# Patient Record
Sex: FEMALE | Race: ASIAN | Employment: FULL TIME | URBAN - METROPOLITAN AREA
[De-identification: names, ages, dates, MRNs, and addresses within clinical notes are randomized per-mention and may not be internally consistent; named-entity substitution may affect disease eponyms.]

---

## 2023-10-30 ENCOUNTER — HOSPITAL ENCOUNTER (EMERGENCY)
Facility: HOSPITAL | Age: 38
Discharge: HOME | End: 2023-10-31
Attending: EMERGENCY MEDICINE
Payer: COMMERCIAL

## 2023-10-30 DIAGNOSIS — O20.0 THREATENED MISCARRIAGE: Primary | ICD-10-CM

## 2023-10-30 DIAGNOSIS — O41.8X10 SUBCHORIONIC HEMATOMA IN FIRST TRIMESTER, SINGLE OR UNSPECIFIED FETUS: ICD-10-CM

## 2023-10-30 DIAGNOSIS — O46.8X1 SUBCHORIONIC HEMATOMA IN FIRST TRIMESTER, SINGLE OR UNSPECIFIED FETUS: ICD-10-CM

## 2023-10-30 LAB
ALBUMIN SERPL-MCNC: 3.3 G/DL (ref 3.4–5)
ALBUMIN/GLOB SERPL: 0.8 {RATIO} (ref 1–2)
ALP LIVER SERPL-CCNC: 73 U/L
ALT SERPL-CCNC: 16 U/L
ANION GAP SERPL CALC-SCNC: 6 MMOL/L (ref 0–18)
AST SERPL-CCNC: 8 U/L (ref 15–37)
B-HCG SERPL-ACNC: ABNORMAL MIU/ML
BASOPHILS # BLD AUTO: 0.02 X10(3) UL (ref 0–0.2)
BASOPHILS NFR BLD AUTO: 0.3 %
BILIRUB SERPL-MCNC: 0.4 MG/DL (ref 0.1–2)
BUN BLD-MCNC: 13 MG/DL (ref 7–18)
CALCIUM BLD-MCNC: 8.6 MG/DL (ref 8.5–10.1)
CHLORIDE SERPL-SCNC: 104 MMOL/L (ref 98–112)
CO2 SERPL-SCNC: 25 MMOL/L (ref 21–32)
CREAT BLD-MCNC: 0.69 MG/DL
EGFRCR SERPLBLD CKD-EPI 2021: 114 ML/MIN/1.73M2 (ref 60–?)
EOSINOPHIL # BLD AUTO: 0.08 X10(3) UL (ref 0–0.7)
EOSINOPHIL NFR BLD AUTO: 1.1 %
ERYTHROCYTE [DISTWIDTH] IN BLOOD BY AUTOMATED COUNT: 16 %
GLOBULIN PLAS-MCNC: 4 G/DL (ref 2.8–4.4)
GLUCOSE BLD-MCNC: 103 MG/DL (ref 70–99)
HCT VFR BLD AUTO: 33 %
HGB BLD-MCNC: 10 G/DL
IMM GRANULOCYTES # BLD AUTO: 0.02 X10(3) UL (ref 0–1)
IMM GRANULOCYTES NFR BLD: 0.3 %
LYMPHOCYTES # BLD AUTO: 1.43 X10(3) UL (ref 1–4)
LYMPHOCYTES NFR BLD AUTO: 18.8 %
MCH RBC QN AUTO: 20 PG (ref 26–34)
MCHC RBC AUTO-ENTMCNC: 30.3 G/DL (ref 31–37)
MCV RBC AUTO: 66.1 FL
MONOCYTES # BLD AUTO: 0.55 X10(3) UL (ref 0.1–1)
MONOCYTES NFR BLD AUTO: 7.2 %
NEUTROPHILS # BLD AUTO: 5.49 X10 (3) UL (ref 1.5–7.7)
NEUTROPHILS # BLD AUTO: 5.49 X10(3) UL (ref 1.5–7.7)
NEUTROPHILS NFR BLD AUTO: 72.3 %
OSMOLALITY SERPL CALC.SUM OF ELEC: 280 MOSM/KG (ref 275–295)
PLATELET # BLD AUTO: 213 10(3)UL (ref 150–450)
POTASSIUM SERPL-SCNC: 3.4 MMOL/L (ref 3.5–5.1)
PROT SERPL-MCNC: 7.3 G/DL (ref 6.4–8.2)
RBC # BLD AUTO: 4.99 X10(6)UL
SODIUM SERPL-SCNC: 135 MMOL/L (ref 136–145)
WBC # BLD AUTO: 7.6 X10(3) UL (ref 4–11)

## 2023-10-30 PROCEDURE — 85025 COMPLETE CBC W/AUTO DIFF WBC: CPT

## 2023-10-30 PROCEDURE — 84702 CHORIONIC GONADOTROPIN TEST: CPT | Performed by: EMERGENCY MEDICINE

## 2023-10-30 PROCEDURE — 99284 EMERGENCY DEPT VISIT MOD MDM: CPT

## 2023-10-30 PROCEDURE — 85025 COMPLETE CBC W/AUTO DIFF WBC: CPT | Performed by: EMERGENCY MEDICINE

## 2023-10-30 PROCEDURE — 80053 COMPREHEN METABOLIC PANEL: CPT | Performed by: EMERGENCY MEDICINE

## 2023-10-30 PROCEDURE — 86900 BLOOD TYPING SEROLOGIC ABO: CPT | Performed by: EMERGENCY MEDICINE

## 2023-10-30 PROCEDURE — 80053 COMPREHEN METABOLIC PANEL: CPT

## 2023-10-30 PROCEDURE — 99285 EMERGENCY DEPT VISIT HI MDM: CPT

## 2023-10-30 PROCEDURE — 86901 BLOOD TYPING SEROLOGIC RH(D): CPT

## 2023-10-30 PROCEDURE — 36415 COLL VENOUS BLD VENIPUNCTURE: CPT

## 2023-10-30 PROCEDURE — 86901 BLOOD TYPING SEROLOGIC RH(D): CPT | Performed by: EMERGENCY MEDICINE

## 2023-10-30 PROCEDURE — 84702 CHORIONIC GONADOTROPIN TEST: CPT

## 2023-10-30 PROCEDURE — 86900 BLOOD TYPING SEROLOGIC ABO: CPT

## 2023-10-31 ENCOUNTER — APPOINTMENT (OUTPATIENT)
Dept: ULTRASOUND IMAGING | Facility: HOSPITAL | Age: 38
End: 2023-10-31
Attending: EMERGENCY MEDICINE
Payer: COMMERCIAL

## 2023-10-31 VITALS
RESPIRATION RATE: 18 BRPM | TEMPERATURE: 98 F | HEART RATE: 83 BPM | BODY MASS INDEX: 27 KG/M2 | DIASTOLIC BLOOD PRESSURE: 68 MMHG | OXYGEN SATURATION: 100 % | SYSTOLIC BLOOD PRESSURE: 100 MMHG | WEIGHT: 146 LBS

## 2023-10-31 LAB — RH BLOOD TYPE: POSITIVE

## 2023-10-31 PROCEDURE — 76801 OB US < 14 WKS SINGLE FETUS: CPT | Performed by: EMERGENCY MEDICINE

## 2023-10-31 NOTE — ED INITIAL ASSESSMENT (HPI)
Pt presents to ed c/o vaginal bleeding, states she is 10 weeks pregnant. States bleeding started about 15min pta. Pt also c/o medial cramping at a 7/10.

## 2023-11-07 LAB
ANTIBODY SCREEN OB: NEGATIVE
HEPATITIS B SURFACE ANTIGEN OB: NEGATIVE
HIV RESULT OB: NEGATIVE
RH FACTOR OB: POSITIVE
SYPHILIS-TOTAL QUAL: NONREACTIVE

## 2024-03-20 LAB — HIV RESULT OB: NEGATIVE

## 2024-04-12 ENCOUNTER — HOSPITAL ENCOUNTER (OUTPATIENT)
Facility: HOSPITAL | Age: 39
Discharge: HOME | End: 2024-04-12
Attending: OBSTETRICS & GYNECOLOGY | Admitting: OBSTETRICS & GYNECOLOGY
Payer: COMMERCIAL

## 2024-04-12 VITALS
RESPIRATION RATE: 18 BRPM | HEIGHT: 62 IN | TEMPERATURE: 98 F | DIASTOLIC BLOOD PRESSURE: 71 MMHG | HEART RATE: 88 BPM | WEIGHT: 161 LBS | SYSTOLIC BLOOD PRESSURE: 122 MMHG | BODY MASS INDEX: 29.63 KG/M2

## 2024-04-12 LAB
BASOPHILS # BLD AUTO: 0.02 X10(3) UL (ref 0–0.2)
BASOPHILS NFR BLD AUTO: 0.2 %
EOSINOPHIL # BLD AUTO: 0.08 X10(3) UL (ref 0–0.7)
EOSINOPHIL NFR BLD AUTO: 0.8 %
ERYTHROCYTE [DISTWIDTH] IN BLOOD BY AUTOMATED COUNT: 22.2 %
HCT VFR BLD AUTO: 26.5 %
HGB BLD-MCNC: 7.1 G/DL
IMM GRANULOCYTES # BLD AUTO: 0.14 X10(3) UL (ref 0–1)
IMM GRANULOCYTES NFR BLD: 1.3 %
KLEIHAUER BETKE RESULT: NEGATIVE
LYMPHOCYTES # BLD AUTO: 1.85 X10(3) UL (ref 1–4)
LYMPHOCYTES NFR BLD AUTO: 17.7 %
MCH RBC QN AUTO: 15.7 PG (ref 26–34)
MCHC RBC AUTO-ENTMCNC: 26.8 G/DL (ref 31–37)
MCV RBC AUTO: 58.5 FL
MONOCYTES # BLD AUTO: 0.85 X10(3) UL (ref 0.1–1)
MONOCYTES NFR BLD AUTO: 8.1 %
NEUTROPHILS # BLD AUTO: 7.53 X10 (3) UL (ref 1.5–7.7)
NEUTROPHILS # BLD AUTO: 7.53 X10(3) UL (ref 1.5–7.7)
NEUTROPHILS NFR BLD AUTO: 71.9 %
PLATELET # BLD AUTO: 254 10(3)UL (ref 150–450)
PLATELETS.RETICULATED NFR BLD AUTO: 9.8 % (ref 0–7)
RBC # BLD AUTO: 4.53 X10(6)UL
WBC # BLD AUTO: 10.5 X10(3) UL (ref 4–11)

## 2024-04-12 PROCEDURE — 36415 COLL VENOUS BLD VENIPUNCTURE: CPT

## 2024-04-12 PROCEDURE — 85460 HEMOGLOBIN FETAL: CPT | Performed by: OBSTETRICS & GYNECOLOGY

## 2024-04-12 PROCEDURE — 59025 FETAL NON-STRESS TEST: CPT

## 2024-04-12 PROCEDURE — 99213 OFFICE O/P EST LOW 20 MIN: CPT

## 2024-04-12 PROCEDURE — 85025 COMPLETE CBC W/AUTO DIFF WBC: CPT | Performed by: OBSTETRICS & GYNECOLOGY

## 2024-04-12 RX ORDER — ACETAMINOPHEN 500 MG
TABLET ORAL
Status: COMPLETED
Start: 2024-04-12 | End: 2024-04-12

## 2024-04-12 RX ORDER — ACETAMINOPHEN 500 MG
1000 TABLET ORAL ONCE
Status: COMPLETED | OUTPATIENT
Start: 2024-04-12 | End: 2024-04-12

## 2024-04-13 NOTE — DISCHARGE INSTRUCTIONS
Discharge Instructions    Diet: regular diet/push fluids  Activity: Normal activity         General Instructions    Call your OB doctor if: Fluid leaking from your vagina;Uterine contractions 10 minutes or closer for 1 to 2 hours;Uterine contractions increasing in intensity and frequency;Decrease in fetal movement;Vaginal bleeding;Temperature greater than 100F;Vaginal or rectal pressure  Early labor comfort measures: Drink fluids and eat small light meals;Relax, sleep, take a warm bath or shower for 30 minutes or less;Take a walk       Labor Discharge Instructions    Call your OB doctor if you have any of the following signs:    Period-like cramps that may come and go  Low, dull backache  Pressure in your lower abdomen that may feel like the baby is pushing down  Change in the type or amount of vaginal discharge  Abdominal cramps that may be accompanied by diarrhea  Fluid leaking from your vagina (may or may not be bloody)  Uterine Activity Instructions:

## 2024-04-13 NOTE — NST
Nonstress Test   Patient: Ness Sanchez    Gestation: 33w5d    NST:       Variability: Moderate           Accelerations: Yes           Decelerations: None            Baseline: 130 BPM           Uterine Irritability: No           Contractions: Not present                                        Acoustic Stimulator: No           Nonstress Test Interpretation: Reactive           Nonstress Test Second Interpretation: Reactive                     Additional Comments    Physician Evaluation      NST Interpretation: Reactive    Disposition:   Discharged    Comments:        Mario Lynne MD

## 2024-04-13 NOTE — PROGRESS NOTES
Written and verbal discharge instructions given to patient and spouse, questions answered and verbalized understanding of teaching. Patient discharged via wheelchair, accompanied by staff, with all belongings, accompanied by spouse, undelivered, not in active labor. Patient instructed to move slowly as to not cause dizziness r/t her anemia and to keep her appointment with Hematology on Wednesday.

## 2024-04-13 NOTE — PROGRESS NOTES
Pt is a 38 year old female admitted to [facility].     Chief Complaint   Patient presents with    Decreased Fetal Movement    Mva/fall/trauma     Patient here with c/o a fall on her stairs around , where her left foot slipped and she landed directly on her back and her neck hit the next step up. She did not hit her abdomen, just her lower back and neck. She states she did not feel the baby move after her fall. She has a scrape on her right elbow and c/o abdominal tightness and cramping and lower back pain since she fell. She denies any leaking of fluid.      Pt is  33w5d intra-uterine pregnancy.  History obtained. Oriented to room, staff, and plan of care.

## 2024-05-02 LAB — STREP GP B CULT OB: NEGATIVE

## 2024-05-07 ENCOUNTER — TELEPHONE (OUTPATIENT)
Dept: OBGYN UNIT | Facility: HOSPITAL | Age: 39
End: 2024-05-07

## 2024-05-09 ENCOUNTER — ANESTHESIA EVENT (OUTPATIENT)
Dept: OBGYN UNIT | Facility: HOSPITAL | Age: 39
End: 2024-05-09
Payer: COMMERCIAL

## 2024-05-09 ENCOUNTER — APPOINTMENT (OUTPATIENT)
Dept: ULTRASOUND IMAGING | Facility: HOSPITAL | Age: 39
End: 2024-05-09
Attending: OBSTETRICS & GYNECOLOGY

## 2024-05-09 ENCOUNTER — TELEPHONE (OUTPATIENT)
Dept: OBGYN UNIT | Facility: HOSPITAL | Age: 39
End: 2024-05-09

## 2024-05-09 ENCOUNTER — ANESTHESIA (OUTPATIENT)
Dept: OBGYN UNIT | Facility: HOSPITAL | Age: 39
End: 2024-05-09
Payer: COMMERCIAL

## 2024-05-09 ENCOUNTER — HOSPITAL ENCOUNTER (INPATIENT)
Facility: HOSPITAL | Age: 39
LOS: 2 days | Discharge: HOME | End: 2024-05-11
Attending: OBSTETRICS & GYNECOLOGY | Admitting: OBSTETRICS & GYNECOLOGY

## 2024-05-09 DIAGNOSIS — Z98.891 H/O CESAREAN SECTION: ICD-10-CM

## 2024-05-09 PROBLEM — Z34.90 PREGNANCY (HCC): Status: ACTIVE | Noted: 2024-05-09

## 2024-05-09 LAB
ANTIBODY SCREEN: NEGATIVE
BASOPHILS # BLD AUTO: 0.02 X10(3) UL (ref 0–0.2)
BASOPHILS NFR BLD AUTO: 0.2 %
EOSINOPHIL # BLD AUTO: 0.05 X10(3) UL (ref 0–0.7)
EOSINOPHIL NFR BLD AUTO: 0.6 %
HCT VFR BLD AUTO: 39.6 %
HGB BLD-MCNC: 11 G/DL
IMM GRANULOCYTES # BLD AUTO: 0.08 X10(3) UL (ref 0–1)
IMM GRANULOCYTES NFR BLD: 0.9 %
LYMPHOCYTES # BLD AUTO: 1.13 X10(3) UL (ref 1–4)
LYMPHOCYTES NFR BLD AUTO: 12.5 %
MCH RBC QN AUTO: 20.2 PG (ref 26–34)
MCHC RBC AUTO-ENTMCNC: 27.8 G/DL (ref 31–37)
MCV RBC AUTO: 72.8 FL
MONOCYTES # BLD AUTO: 0.58 X10(3) UL (ref 0.1–1)
MONOCYTES NFR BLD AUTO: 6.4 %
NEUTROPHILS # BLD AUTO: 7.19 X10 (3) UL (ref 1.5–7.7)
NEUTROPHILS # BLD AUTO: 7.19 X10(3) UL (ref 1.5–7.7)
NEUTROPHILS NFR BLD AUTO: 79.4 %
PLATELET # BLD AUTO: 278 10(3)UL (ref 150–450)
PLATELET MORPHOLOGY: NORMAL
PLATELETS.RETICULATED NFR BLD AUTO: 10.7 % (ref 0–7)
RBC # BLD AUTO: 5.44 X10(6)UL
RH BLOOD TYPE: POSITIVE
RUPTURE OF MEMBRANE (ROM): POSITIVE
T PALLIDUM AB SER QL IA: NONREACTIVE
WBC # BLD AUTO: 9.1 X10(3) UL (ref 4–11)

## 2024-05-09 PROCEDURE — 86780 TREPONEMA PALLIDUM: CPT | Performed by: OBSTETRICS & GYNECOLOGY

## 2024-05-09 PROCEDURE — 86901 BLOOD TYPING SEROLOGIC RH(D): CPT | Performed by: OBSTETRICS & GYNECOLOGY

## 2024-05-09 PROCEDURE — 88302 TISSUE EXAM BY PATHOLOGIST: CPT | Performed by: OBSTETRICS & GYNECOLOGY

## 2024-05-09 PROCEDURE — 84112 EVAL AMNIOTIC FLUID PROTEIN: CPT | Performed by: OBSTETRICS & GYNECOLOGY

## 2024-05-09 PROCEDURE — 86900 BLOOD TYPING SEROLOGIC ABO: CPT | Performed by: OBSTETRICS & GYNECOLOGY

## 2024-05-09 PROCEDURE — 99214 OFFICE O/P EST MOD 30 MIN: CPT

## 2024-05-09 PROCEDURE — 86850 RBC ANTIBODY SCREEN: CPT | Performed by: OBSTETRICS & GYNECOLOGY

## 2024-05-09 PROCEDURE — 0UB70ZZ EXCISION OF BILATERAL FALLOPIAN TUBES, OPEN APPROACH: ICD-10-PCS | Performed by: OBSTETRICS & GYNECOLOGY

## 2024-05-09 PROCEDURE — 85025 COMPLETE CBC W/AUTO DIFF WBC: CPT | Performed by: OBSTETRICS & GYNECOLOGY

## 2024-05-09 RX ORDER — DEXTROSE, SODIUM CHLORIDE, SODIUM LACTATE, POTASSIUM CHLORIDE, AND CALCIUM CHLORIDE 5; .6; .31; .03; .02 G/100ML; G/100ML; G/100ML; G/100ML; G/100ML
INJECTION, SOLUTION INTRAVENOUS CONTINUOUS PRN
Status: DISCONTINUED | OUTPATIENT
Start: 2024-05-09 | End: 2024-05-11

## 2024-05-09 RX ORDER — DIPHENHYDRAMINE HCL 25 MG
25 CAPSULE ORAL EVERY 4 HOURS PRN
Status: DISCONTINUED | OUTPATIENT
Start: 2024-05-09 | End: 2024-05-11

## 2024-05-09 RX ORDER — HYDROMORPHONE HYDROCHLORIDE 1 MG/ML
0.4 INJECTION, SOLUTION INTRAMUSCULAR; INTRAVENOUS; SUBCUTANEOUS EVERY 5 MIN PRN
Status: DISCONTINUED | OUTPATIENT
Start: 2024-05-09 | End: 2024-05-09 | Stop reason: HOSPADM

## 2024-05-09 RX ORDER — ONDANSETRON 2 MG/ML
4 INJECTION INTRAMUSCULAR; INTRAVENOUS ONCE AS NEEDED
Status: COMPLETED | OUTPATIENT
Start: 2024-05-09 | End: 2024-05-09

## 2024-05-09 RX ORDER — NALOXONE HYDROCHLORIDE 0.4 MG/ML
0.08 INJECTION, SOLUTION INTRAMUSCULAR; INTRAVENOUS; SUBCUTANEOUS
Status: ACTIVE | OUTPATIENT
Start: 2024-05-09 | End: 2024-05-10

## 2024-05-09 RX ORDER — HYDROMORPHONE HYDROCHLORIDE 1 MG/ML
0.2 INJECTION, SOLUTION INTRAMUSCULAR; INTRAVENOUS; SUBCUTANEOUS EVERY 5 MIN PRN
Status: DISCONTINUED | OUTPATIENT
Start: 2024-05-09 | End: 2024-05-09 | Stop reason: HOSPADM

## 2024-05-09 RX ORDER — NALBUPHINE HYDROCHLORIDE 10 MG/ML
2.5 INJECTION, SOLUTION INTRAMUSCULAR; INTRAVENOUS; SUBCUTANEOUS EVERY 4 HOURS PRN
Status: DISCONTINUED | OUTPATIENT
Start: 2024-05-09 | End: 2024-05-11

## 2024-05-09 RX ORDER — CEFAZOLIN SODIUM/WATER 2 G/20 ML
2 SYRINGE (ML) INTRAVENOUS ONCE
Status: COMPLETED | OUTPATIENT
Start: 2024-05-09 | End: 2024-05-09

## 2024-05-09 RX ORDER — ONDANSETRON 2 MG/ML
4 INJECTION INTRAMUSCULAR; INTRAVENOUS EVERY 6 HOURS PRN
Status: DISCONTINUED | OUTPATIENT
Start: 2024-05-09 | End: 2024-05-11

## 2024-05-09 RX ORDER — KETOROLAC TROMETHAMINE 30 MG/ML
30 INJECTION, SOLUTION INTRAMUSCULAR; INTRAVENOUS EVERY 6 HOURS PRN
Status: DISCONTINUED | OUTPATIENT
Start: 2024-05-09 | End: 2024-05-11

## 2024-05-09 RX ORDER — SODIUM CHLORIDE, SODIUM LACTATE, POTASSIUM CHLORIDE, CALCIUM CHLORIDE 600; 310; 30; 20 MG/100ML; MG/100ML; MG/100ML; MG/100ML
INJECTION, SOLUTION INTRAVENOUS CONTINUOUS
Status: DISCONTINUED | OUTPATIENT
Start: 2024-05-09 | End: 2024-05-11

## 2024-05-09 RX ORDER — CITRIC ACID/SODIUM CITRATE 334-500MG
30 SOLUTION, ORAL ORAL ONCE
Status: COMPLETED | OUTPATIENT
Start: 2024-05-09 | End: 2024-05-09

## 2024-05-09 RX ORDER — BISACODYL 10 MG
10 SUPPOSITORY, RECTAL RECTAL ONCE AS NEEDED
Status: DISCONTINUED | OUTPATIENT
Start: 2024-05-09 | End: 2024-05-11

## 2024-05-09 RX ORDER — SODIUM CHLORIDE, SODIUM LACTATE, POTASSIUM CHLORIDE, CALCIUM CHLORIDE 600; 310; 30; 20 MG/100ML; MG/100ML; MG/100ML; MG/100ML
125 INJECTION, SOLUTION INTRAVENOUS CONTINUOUS
Status: DISCONTINUED | OUTPATIENT
Start: 2024-05-09 | End: 2024-05-09

## 2024-05-09 RX ORDER — KETOROLAC TROMETHAMINE 30 MG/ML
INJECTION, SOLUTION INTRAMUSCULAR; INTRAVENOUS
Status: DISPENSED
Start: 2024-05-09 | End: 2024-05-10

## 2024-05-09 RX ORDER — HYDROMORPHONE HYDROCHLORIDE 1 MG/ML
0.3 INJECTION, SOLUTION INTRAMUSCULAR; INTRAVENOUS; SUBCUTANEOUS EVERY 2 HOUR PRN
Status: DISCONTINUED | OUTPATIENT
Start: 2024-05-09 | End: 2024-05-11

## 2024-05-09 RX ORDER — NALBUPHINE HYDROCHLORIDE 10 MG/ML
2.5 INJECTION, SOLUTION INTRAMUSCULAR; INTRAVENOUS; SUBCUTANEOUS
Status: DISCONTINUED | OUTPATIENT
Start: 2024-05-09 | End: 2024-05-09 | Stop reason: HOSPADM

## 2024-05-09 RX ORDER — ONDANSETRON 2 MG/ML
INJECTION INTRAMUSCULAR; INTRAVENOUS
Status: COMPLETED
Start: 2024-05-09 | End: 2024-05-09

## 2024-05-09 RX ORDER — MORPHINE SULFATE 4 MG/ML
INJECTION, SOLUTION INTRAMUSCULAR; INTRAVENOUS
Status: DISPENSED
Start: 2024-05-09 | End: 2024-05-10

## 2024-05-09 RX ORDER — OXYCODONE HYDROCHLORIDE 5 MG/1
5 TABLET ORAL EVERY 6 HOURS PRN
Status: DISCONTINUED | OUTPATIENT
Start: 2024-05-09 | End: 2024-05-11

## 2024-05-09 RX ORDER — MORPHINE SULFATE 0.5 MG/ML
0.2 INJECTION, SOLUTION EPIDURAL; INTRATHECAL; INTRAVENOUS ONCE
Status: DISCONTINUED | OUTPATIENT
Start: 2024-05-09 | End: 2024-05-09

## 2024-05-09 RX ORDER — DOCUSATE SODIUM 100 MG/1
100 CAPSULE, LIQUID FILLED ORAL
Status: DISCONTINUED | OUTPATIENT
Start: 2024-05-09 | End: 2024-05-11

## 2024-05-09 RX ORDER — ONDANSETRON 2 MG/ML
4 INJECTION INTRAMUSCULAR; INTRAVENOUS EVERY 6 HOURS PRN
Status: DISCONTINUED | OUTPATIENT
Start: 2024-05-09 | End: 2024-05-09

## 2024-05-09 RX ORDER — MORPHINE SULFATE 4 MG/ML
2 INJECTION, SOLUTION INTRAMUSCULAR; INTRAVENOUS EVERY 2 HOUR PRN
Status: ACTIVE | OUTPATIENT
Start: 2024-05-09 | End: 2024-05-10

## 2024-05-09 RX ORDER — DIPHENHYDRAMINE HYDROCHLORIDE 50 MG/ML
12.5 INJECTION INTRAMUSCULAR; INTRAVENOUS EVERY 4 HOURS PRN
Status: DISCONTINUED | OUTPATIENT
Start: 2024-05-09 | End: 2024-05-11

## 2024-05-09 RX ORDER — BUPIVACAINE HYDROCHLORIDE 7.5 MG/ML
INJECTION, SOLUTION INTRASPINAL AS NEEDED
Status: DISCONTINUED | OUTPATIENT
Start: 2024-05-09 | End: 2024-05-09 | Stop reason: SURG

## 2024-05-09 RX ORDER — PHENYLEPHRINE HCL 10 MG/ML
VIAL (ML) INJECTION AS NEEDED
Status: DISCONTINUED | OUTPATIENT
Start: 2024-05-09 | End: 2024-05-09 | Stop reason: SURG

## 2024-05-09 RX ORDER — SIMETHICONE 80 MG
80 TABLET,CHEWABLE ORAL DAILY PRN
Status: DISCONTINUED | OUTPATIENT
Start: 2024-05-09 | End: 2024-05-11

## 2024-05-09 RX ORDER — KETOROLAC TROMETHAMINE 30 MG/ML
30 INJECTION, SOLUTION INTRAMUSCULAR; INTRAVENOUS ONCE
Status: COMPLETED | OUTPATIENT
Start: 2024-05-09 | End: 2024-05-09

## 2024-05-09 RX ORDER — KETOROLAC TROMETHAMINE 30 MG/ML
30 INJECTION, SOLUTION INTRAMUSCULAR; INTRAVENOUS EVERY 6 HOURS
Status: DISPENSED | OUTPATIENT
Start: 2024-05-09 | End: 2024-05-10

## 2024-05-09 RX ORDER — MORPHINE SULFATE 2 MG/ML
INJECTION, SOLUTION INTRAMUSCULAR; INTRAVENOUS AS NEEDED
Status: DISCONTINUED | OUTPATIENT
Start: 2024-05-09 | End: 2024-05-09 | Stop reason: SURG

## 2024-05-09 RX ORDER — ACETAMINOPHEN 500 MG
1000 TABLET ORAL EVERY 6 HOURS
Status: DISCONTINUED | OUTPATIENT
Start: 2024-05-09 | End: 2024-05-11

## 2024-05-09 RX ORDER — ACETAMINOPHEN 500 MG
1000 TABLET ORAL ONCE
Status: COMPLETED | OUTPATIENT
Start: 2024-05-09 | End: 2024-05-09

## 2024-05-09 RX ORDER — MEPERIDINE HYDROCHLORIDE 25 MG/ML
12.5 INJECTION INTRAMUSCULAR; INTRAVENOUS; SUBCUTANEOUS ONCE AS NEEDED
Status: DISCONTINUED | OUTPATIENT
Start: 2024-05-09 | End: 2024-05-09 | Stop reason: HOSPADM

## 2024-05-09 RX ORDER — IBUPROFEN 600 MG/1
600 TABLET ORAL EVERY 6 HOURS
Status: DISCONTINUED | OUTPATIENT
Start: 2024-05-10 | End: 2024-05-10

## 2024-05-09 RX ADMIN — CEFAZOLIN SODIUM/WATER 2 G: 2 G/20 ML SYRINGE (ML) INTRAVENOUS at 17:36:00

## 2024-05-09 RX ADMIN — PHENYLEPHRINE HCL 100 MCG: 10 MG/ML VIAL (ML) INJECTION at 17:32:00

## 2024-05-09 RX ADMIN — SODIUM CHLORIDE, SODIUM LACTATE, POTASSIUM CHLORIDE, CALCIUM CHLORIDE: 600; 310; 30; 20 INJECTION, SOLUTION INTRAVENOUS at 18:27:00

## 2024-05-09 RX ADMIN — PHENYLEPHRINE HCL 100 MCG: 10 MG/ML VIAL (ML) INJECTION at 17:43:00

## 2024-05-09 RX ADMIN — PHENYLEPHRINE HCL 100 MCG: 10 MG/ML VIAL (ML) INJECTION at 17:39:00

## 2024-05-09 RX ADMIN — PHENYLEPHRINE HCL 100 MCG: 10 MG/ML VIAL (ML) INJECTION at 17:58:00

## 2024-05-09 RX ADMIN — ONDANSETRON 4 MG: 2 INJECTION INTRAMUSCULAR; INTRAVENOUS at 18:02:00

## 2024-05-09 RX ADMIN — CEFAZOLIN SODIUM/WATER: 2 G/20 ML SYRINGE (ML) INTRAVENOUS at 18:27:00

## 2024-05-09 RX ADMIN — PHENYLEPHRINE HCL 100 MCG: 10 MG/ML VIAL (ML) INJECTION at 17:41:00

## 2024-05-09 RX ADMIN — PHENYLEPHRINE HCL 100 MCG: 10 MG/ML VIAL (ML) INJECTION at 17:37:00

## 2024-05-09 RX ADMIN — SODIUM CHLORIDE, SODIUM LACTATE, POTASSIUM CHLORIDE, CALCIUM CHLORIDE: 600; 310; 30; 20 INJECTION, SOLUTION INTRAVENOUS at 17:27:00

## 2024-05-09 RX ADMIN — MORPHINE SULFATE 0.2 MG: 2 INJECTION, SOLUTION INTRAMUSCULAR; INTRAVENOUS at 17:27:00

## 2024-05-09 RX ADMIN — BUPIVACAINE HYDROCHLORIDE 1.7 ML: 7.5 INJECTION, SOLUTION INTRASPINAL at 17:27:00

## 2024-05-09 RX ADMIN — PHENYLEPHRINE HCL 100 MCG: 10 MG/ML VIAL (ML) INJECTION at 17:45:00

## 2024-05-09 NOTE — PROGRESS NOTES
Pt is a 38 year old female admitted to [facility].     Chief Complaint   Patient presents with    R/o Rom     Pt felt a \"large gush of discharge\" around 9am and has continued to have a liquid brown/pink fluid leaking.      Pt is  37w4d intra-uterine pregnancy.  History obtained, consents signed. Oriented to room, staff, and plan of care.

## 2024-05-09 NOTE — ANESTHESIA PROCEDURE NOTES
Spinal Block    Date/Time: 5/9/2024 5:23 PM    Performed by: Bentley Slaughter MD  Authorized by: Bentley Slaughter MD      General Information and Staff    Start Time:  5/9/2024 5:23 PM  End Time:  5/9/2024 5:30 PM  Anesthesiologist:  Bentley Slaughter MD  Performed by:  Anesthesiologist  Patient Location:  OB  Site identification: surface landmarks    Preanesthetic Checklist: patient identified, IV checked, risks and benefits discussed, monitors and equipment checked, pre-op evaluation, timeout performed, anesthesia consent and sterile technique used      Procedure Details    Patient Position:  Sitting  Prep: ChloraPrep    Monitoring:  Cardiac monitor, heart rate and continuous pulse ox  Approach:  Midline  Location:  L3-4  Injection Technique:  Single-shot    Needle    Needle Type:  Sprotte  Needle Gauge:  24 G  Needle Length:  3.5 in    Assessment    Sensory Level:  T8  Events: clear CSF, CSF aspirated, well tolerated and blood negative      Additional Comments

## 2024-05-09 NOTE — ANESTHESIA POSTPROCEDURE EVALUATION
[facility] Patient Status:  Inpatient   Age/Gender 38 year old female MRN [id number]   Location [facility] LABOR & DELIVERY Attending Mario Lynne MD    Day # 0 [facility] BEATRICE KEARNEY       Anesthesia Post-op Note     SECTION WITH BILATERAL SALPINGECTOMY    Procedure Summary       Date: 24 Room / Location:  L+D OR  /  L+D OR    Anesthesia Start:  Anesthesia Stop:     Procedure:  SECTION WITH BILATERAL SALPINGECTOMY Diagnosis: (Repeat  Low Transverse  Section and bilateral salpingectomy)    Surgeons: Mario Lynne MD Anesthesiologist: Bentley Slaughter MD    Anesthesia Type: spinal ASA Status: 2 - Emergent            Anesthesia Type: spinal    Vitals Value Taken Time   /57 24 1855   Temp 97.3 °F (36.3 °C) 24 1855   Pulse 96 24 1855   Resp 20 24 1855   SpO2 99 % 24 185   Vitals shown include unfiled device data.    Patient Location: Labor and Delivery    Anesthesia Type: spinal    Airway Patency: patent    Postop Pain Control: adequate    Mental Status: mildly sedated but able to meaningfully participate in the post-anesthesia evaluation    Nausea/Vomiting: none    Cardiopulmonary/Hydration status: stable euvolemic    Complications: no apparent anesthesia related complications    Postop vital signs: stable    Dental Exam: Unchanged from Preop    Patient to be discharged from PACU when criteria met.

## 2024-05-09 NOTE — OPERATIVE REPORT
[facility]   Section - Operative Note    Ness Sanchez Patient Status:  Inpatient    1985 MRN [id number]   Location [facility] LABOR & DELIVERY Attending Mario Lynne MD   Hosp Day # 0 PCP BEATRICE KEARNEY       Preoperative Diagnosis: IUP at 37 4/7 weeks; PROM; previous  x2; desiring sterilization         Postoperative Diagnosis: Same    Procedure: Repeat  Low Transverse  Section and bilateral salpingectomy     Primary surgeon: Maged    Assistant: ALLEN Kaufman    Indications:  PROM /previous  x 2    Surgical Findings: Baby: viable female    Weight 5lbs 12oz  APGAR 1': 9  APGAR 5': 9      Anesthesia :Spinal    EBL: see QBL    Procedure:     The patient was prepped and draped in the usual sterile fashion. A time out was performed and scd’s were placed to decrease her risk for DVT and a dose of antibiotics was given preoperatively to decrease her risk for infection. Anesthesia was found to be adequate. A Pfannenstiel skin incision was made and extended down to the level of the fascia. There was extensive amount of adhesions. The fascia was incised and extended laterally with Enriquez scissors.  The fascia was then dissected off the rectus muscles superiorly and inferiorly.  The peritoneal cavity was entered atraumatically and extended superiorly and inferiorly with good visualization of the bladder.  Bladder blade was inserted.  The vesico-uterine peritoneum was entered sharply using metzenbaum scissors.  A bladder flap was created.  A low transverse incision was created and extended laterally using blunt finger dissection and amniotomy was performed. The amniotic fluid was clear. The infant was noted to be in the vertex position.  The head was delivered and the infant was bulb suctioned.  The remainder of the body was delivered without complication.  The infant was vigorously crying. The cord was clamped and cut and infant was handed to the awaiting neonatologist.   Cord blood was obtained.  The placenta was delivered manually intact with a 3 vessel cord.  The uterus was cleared of all clots and debris.  The uterus was exteriorized.  Uterus, tubes and ovaries were normal in appearance.  The first layer of the hysterotomy was closed using 0 vicryl.  A second imbricating layer was placed with 0 vircyl.  The incision was inspected for hemostasis. The left fallopian tube and right fallopian tube were transected and cauterized along the mesosalpinx.  Excellent hemostasis achieved.  Re-inspection of the hysterotomy, peritomeum and rectus muscles noted them to be entirely hemostatic.  The fascia was closed with 0 vicryl in a running fashion.  The subcutaneous tissue was copiously irrigated and any bleeding cauterized. Sub Q was closed using 2-0 plain in interrupted fashion. The skin was closed using 4-0 monocryl in subcuticular fashion.  The sponge and instrument counts were reported to me as being correct.  The patient tolerated the procedure and was stable to the recovery room.  The infant was stable to the recovery room.    Specimen:  bilateral tubes    Drains: viera    Condition:   stable    Complications: None; patient tolerated the procedure well.    Comment: The physician  surgical assist provided aid in exposure, hemostasis, closure and other intraoperative technical functions to help the surgeon carry out a safe operation and optimize results.     Mario Lynne MD  5/9/2024  4:26 PM

## 2024-05-09 NOTE — ANESTHESIA PREPROCEDURE EVALUATION
PRE-OP EVALUATION    Patient Name: Ness Sanchez    Admit Diagnosis: Pregnancy (HCC) [Z34.90]    Pre-op Diagnosis: * No pre-op diagnosis entered *     SECTION WITH BILATERAL SALPINGECTOMY    Anesthesia Procedure:  SECTION WITH BILATERAL SALPINGECTOMY    Surgeons and Role:     * Mario Lynne MD - Primary     * Lillian Kaufman DO - Assisting Surgeon    Pre-op vitals reviewed.  Temp: 98.3 °F (36.8 °C)  Pulse: 90  Resp: 16  BP: 121/74     Body mass index is 29.63 kg/m².    Current medications reviewed.  Hospital Medications:  • [COMPLETED] lactated ringers IV bolus 1,000 mL  1,000 mL Intravenous Once    Followed by   • lactated ringers infusion  125 mL/hr Intravenous Continuous   • [COMPLETED] acetaminophen (Tylenol Extra Strength) tab 1,000 mg  1,000 mg Oral Once   • ondansetron (Zofran) 4 MG/2ML injection 4 mg  4 mg Intravenous Q6H PRN   • [COMPLETED] sodium citrate-citric acid (Bicitra) 500-334 MG/5ML oral solution 30 mL  30 mL Oral Once   • oxyTOCIN in sodium chloride 0.9% (Pitocin) 30 Units/500mL infusion premix  62.5-900 maral-units/min Intravenous Continuous   • [COMPLETED] azithromycin (Zithromax) 500 mg in sodium chloride 0.9% 250mL IVPB premix  500 mg Intravenous 30 Min Pre-Op   • ceFAZolin (Ancef) 2 g in 20mL IV syringe premix  2 g Intravenous Once   • [COMPLETED] acetaminophen (Tylenol Extra Strength) tab 1,000 mg  1,000 mg Oral Once       Outpatient Medications:     Medications Prior to Admission   Medication Sig Dispense Refill Last Dose   • Drospirenone-Ethinyl Estradiol (JAE OR) Take by mouth. (Patient not taking: Reported on 10/30/2023)      • ZIANA 1.2-0.025 % Apply Externally Gel Apply to face QD (Patient not taking: Reported on 10/30/2023) 60 g 3        Allergies: Patient has no known allergies.      Anesthesia Evaluation    Patient summary reviewed.    Anesthetic Complications           GI/Hepatic/Renal                                 Cardiovascular                                                        Endo/Other                                  Pulmonary                           Neuro/Psych                                    Past Surgical History:   Procedure Laterality Date   •        Social History     Socioeconomic History   • Marital status:    Tobacco Use   • Smoking status: Never   • Smokeless tobacco: Never   Substance and Sexual Activity   • Alcohol use: No     Alcohol/week: 0.0 standard drinks of alcohol   • Drug use: No   • Sexual activity: Never     History   Drug Use No     Available pre-op labs reviewed.  Lab Results   Component Value Date    WBC 9.1 2024    RBC 5.44 (H) 2024    HGB 11.0 (L) 2024    HCT 39.6 2024    MCV 72.8 (L) 2024    MCH 20.2 (L) 2024    MCHC 27.8 (L) 2024    RDW 22.2 2024    .0 2024               Airway      Mallampati: I  Mouth opening: >3 FB  TM distance: > 6 cm  Neck ROM: full Cardiovascular    Cardiovascular exam normal.  Rhythm: regular  Rate: normal     Dental             Pulmonary    Pulmonary exam normal.                 Other findings          ASA: 2 and emergent  Plan: spinal  NPO status verified and patient meets guidelines.          Plan/risks discussed with: patient and spouse            Present on Admission:  **None**

## 2024-05-10 ENCOUNTER — TELEPHONE (OUTPATIENT)
Dept: OBGYN UNIT | Facility: HOSPITAL | Age: 39
End: 2024-05-10

## 2024-05-10 LAB
BASOPHILS # BLD AUTO: 0.01 X10(3) UL (ref 0–0.2)
BASOPHILS NFR BLD AUTO: 0.1 %
EOSINOPHIL # BLD AUTO: 0.03 X10(3) UL (ref 0–0.7)
EOSINOPHIL NFR BLD AUTO: 0.3 %
HCT VFR BLD AUTO: 32 %
HGB BLD-MCNC: 8.9 G/DL
IMM GRANULOCYTES # BLD AUTO: 0.06 X10(3) UL (ref 0–1)
IMM GRANULOCYTES NFR BLD: 0.6 %
LYMPHOCYTES # BLD AUTO: 1.01 X10(3) UL (ref 1–4)
LYMPHOCYTES NFR BLD AUTO: 10.8 %
MCH RBC QN AUTO: 20.3 PG (ref 26–34)
MCHC RBC AUTO-ENTMCNC: 27.8 G/DL (ref 31–37)
MCV RBC AUTO: 72.9 FL
MONOCYTES # BLD AUTO: 0.59 X10(3) UL (ref 0.1–1)
MONOCYTES NFR BLD AUTO: 6.3 %
NEUTROPHILS # BLD AUTO: 7.65 X10 (3) UL (ref 1.5–7.7)
NEUTROPHILS # BLD AUTO: 7.65 X10(3) UL (ref 1.5–7.7)
NEUTROPHILS NFR BLD AUTO: 81.9 %
PLATELET # BLD AUTO: 226 10(3)UL (ref 150–450)
PLATELETS.RETICULATED NFR BLD AUTO: 10.4 % (ref 0–7)
RBC # BLD AUTO: 4.39 X10(6)UL
WBC # BLD AUTO: 9.4 X10(3) UL (ref 4–11)

## 2024-05-10 PROCEDURE — 85025 COMPLETE CBC W/AUTO DIFF WBC: CPT | Performed by: OBSTETRICS & GYNECOLOGY

## 2024-05-10 RX ORDER — IBUPROFEN 600 MG/1
600 TABLET ORAL EVERY 6 HOURS
Status: DISCONTINUED | OUTPATIENT
Start: 2024-05-10 | End: 2024-05-11

## 2024-05-10 NOTE — PLAN OF CARE
Problem: GASTROINTESTINAL - ADULT  Goal: Minimal or absence of nausea and vomiting  Description: INTERVENTIONS:  - Maintain adequate hydration with IV or PO as ordered and tolerated  - Nasogastric tube to low intermittent suction as ordered  - Evaluate effectiveness of ordered antiemetic medications  - Provide nonpharmacologic comfort measures as appropriate  - Advance diet as tolerated, if ordered  - Obtain nutritional consult as needed  - Evaluate fluid balance  Outcome: Progressing  Goal: Maintains or returns to baseline bowel function  Description: INTERVENTIONS:  - Assess bowel function  - Maintain adequate hydration with IV or PO as ordered and tolerated  - Evaluate effectiveness of GI medications  - Encourage mobilization and activity  - Obtain nutritional consult as needed  - Establish a toileting routine/schedule  - Consider collaborating with pharmacy to review patient's medication profile  Outcome: Progressing     Problem: Patient/Family Goals  Goal: Patient/Family Long Term Goal  Description: Patient's Long Term Goal: safe, uncomplicated delivery of a healthy .     Interventions:  - See additional Care Plan goals for specific interventions  Outcome: Completed  Goal: Patient/Family Short Term Goal  Description: Patient's Short Term Goal: effective pain management,     Interventions:   - See additional Care Plan goals for specific interventions  Outcome: Completed     Problem: BIRTH - VAGINAL/ SECTION  Goal: Fetal and maternal status remain reassuring during the birth process  Description: INTERVENTIONS:  - Monitor vital signs  - Monitor fetal heart rate  - Monitor uterine activity  - Monitor labor progression (vaginal delivery)  - DVT prophylaxis (C/S delivery)  - Surgical antibiotic prophylaxis (C/S delivery)  Outcome: Completed     Problem: PAIN - ADULT  Goal: Verbalizes/displays adequate comfort level or patient's stated pain goal  Description: INTERVENTIONS:  - Encourage pt to monitor  pain and request assistance  - Assess pain using appropriate pain scale  - Administer analgesics based on type and severity of pain and evaluate response  - Implement non-pharmacological measures as appropriate and evaluate response  - Consider cultural and social influences on pain and pain management  - Manage/alleviate anxiety  - Utilize distraction and/or relaxation techniques  - Monitor for opioid side effects  - Notify MD/LIP if interventions unsuccessful or patient reports new pain  - Anticipate increased pain with activity and pre-medicate as appropriate  Outcome: Completed     Problem: ANXIETY  Goal: Will report anxiety at manageable levels  Description: INTERVENTIONS:  - Administer medication as ordered  - Teach and rehearse alternative coping skills  - Provide emotional support with 1:1 interaction with staff  Outcome: Completed

## 2024-05-10 NOTE — PROGRESS NOTES
Admitted to mother/baby room 2199 in stable condition. Postpartum education initiated. Bed in low position, call light in reach. Instructed pt to call for assistance when getting out of bed. Updated on plan of care. Voiced understanding.

## 2024-05-10 NOTE — PROGRESS NOTES
Report received from elijah genao RN. Patient resting comfortably in bed. POC discussed with patient. All questions answered. Patient verbalizes understanding. Call light within reach.

## 2024-05-10 NOTE — PROGRESS NOTES
Pt transferred to Mother Baby room 2199 in stable condition via cart. Report given to Kandice OSHEA. Infant transferred with mother in stable condition. Accompanied by ,

## 2024-05-10 NOTE — PLAN OF CARE
Problem: GASTROINTESTINAL - ADULT  Goal: Minimal or absence of nausea and vomiting  Description: INTERVENTIONS:  - Maintain adequate hydration with IV or PO as ordered and tolerated  - Nasogastric tube to low intermittent suction as ordered  - Evaluate effectiveness of ordered antiemetic medications  - Provide nonpharmacologic comfort measures as appropriate  - Advance diet as tolerated, if ordered  - Obtain nutritional consult as needed  - Evaluate fluid balance  Outcome: Progressing  Goal: Maintains or returns to baseline bowel function  Description: INTERVENTIONS:  - Assess bowel function  - Maintain adequate hydration with IV or PO as ordered and tolerated  - Evaluate effectiveness of GI medications  - Encourage mobilization and activity  - Obtain nutritional consult as needed  - Establish a toileting routine/schedule  - Consider collaborating with pharmacy to review patient's medication profile  Outcome: Progressing     Problem: SAFETY ADULT - FALL  Goal: Free from fall injury  Description: INTERVENTIONS:  - Assess pt frequently for physical needs  - Identify cognitive and physical deficits and behaviors that affect risk of falls.  - [place] fall precautions as indicated by assessment.  - Educate pt/family on patient safety including physical limitations  - Instruct pt to call for assistance with activity based on assessment  - Modify environment to reduce risk of injury  - Provide assistive devices as appropriate  - Consider OT/PT consult to assist with strengthening/mobility  - Encourage toileting schedule  Outcome: Progressing     Problem: SKIN/TISSUE INTEGRITY - ADULT  Goal: Skin integrity remains intact  Description: INTERVENTIONS  - Assess and document risk factors for pressure ulcer development  - Assess and document skin integrity  - Monitor for areas of redness and/or skin breakdown  - Initiate interventions, skin care algorithm/standards of care as needed  Outcome: Progressing  Goal: Incision(s),  wounds(s) or drain site(s) healing without S/S of infection  Description: INTERVENTIONS:  - Assess and document risk factors for pressure ulcer development  - Assess and document skin integrity  - Assess and document dressing/incision, wound bed, drain sites and surrounding tissue  - Implement wound care per orders  - Initiate isolation precautions as appropriate  - Initiate Pressure Ulcer prevention bundle as indicated  Outcome: Progressing  Goal: Oral mucous membranes remain intact  Description: INTERVENTIONS  - Assess oral mucosa and hygiene practices  - Implement preventative oral hygiene regimen  - Implement oral medicated treatments as ordered  Outcome: Progressing

## 2024-05-10 NOTE — PROGRESS NOTES
[facility] 2SW-J n    Ness Sanchez Patient Status:  Inpatient   Age/Gender 38 year old female MRN [id number]   Location [facility] 2SW-J Attending Mario Lynne MD   Hosp Day # 1 PCP BEATRICE DAWKINS-Section Anesthesia Pain Progress Note    Anesthesia Technique:   Spinal Anesthesia     Pain Management Technique:  In addition to available oral supplemental and IV medications  Patient received neuraxial preservative free morphine for post procedural pain control.    Post Procedure Pain Quality:    Adequate    Pain Management Side Effects:  None     /80 (BP Location: Left arm)   Pulse 69   Temp 97.6 °F (36.4 °C) (Oral)   Resp 18   Ht 1.575 m (5' 2\")   Wt 73.5 kg (162 lb)   LMP 2023   SpO2 99%   Breastfeeding No   BMI 29.63 kg/m²       Injection Site: Injection site is intact on inspection     Complications from Pain Management or Anesthesia:   None    All patient questions were answered.  Follow up pain management is separate from intraoperative anesthetic needs.  Pain care is transitioned to primary service, with management by oral medications.    Thank you for asking us to participate in the care of your patient.    Amy Thurston MD, 05/10/24, 10:33 AM      Amy Thurston MD, 05/10/24, 10:33 AM

## 2024-05-10 NOTE — PROGRESS NOTES
OB Progress Note POD#1    S: She is feeling well. Minimal VB. Pain controlled. Bottle feeding. Nelson recently removed, no void yet. + flatus, no BM    O: Blood pressure 110/66, pulse 72, temperature 97.7 °F (36.5 °C), temperature source Oral, resp. rate 18, height 5' 2\" (1.575 m), weight 162 lb (73.5 kg), last menstrual period 08/20/2023, SpO2 99%, not currently breastfeeding.    Intake/Output Summary (Last 24 hours) at 5/10/2024 1506  Last data filed at 5/10/2024 1253  Gross per 24 hour   Intake 2513.75 ml   Output 2680 ml   Net -166.25 ml       Gen: NAD, AAOx3  Breasts: soft, nontender, nonerythematous  Heart: RRR, no m/r/g  Lungs: CTA B/L  Abdomen: soft, minimally tender, nondistended, incision C/D/I with pressure dressing  Gyne: minimal lochia  Ext: trace pitting edema    Lab Results   Component Value Date    WBC 9.4 05/10/2024    HGB 8.9 05/10/2024    HCT 32.0 05/10/2024    .0 05/10/2024       A/P: POD #1 s/p LTCS  1. Continue pain control with tylenol and motrin. Oxy PRN   -- Toradol for first 24 hours then ibuprofen scheduled  2. Breastfeeding, given encouragement   -- Lactation consult PRN  3. Postpartum anemia   -- Hgb 8.9, asymptomatic . Venofer then remove IV  4. DVT ppx: continue ambulation  5. Fluids/elec: saline lock today then remove    Continue inpatient observation    Kimberley Narvaez D.O.  [facility]  Contact via [employer] or cell

## 2024-05-11 VITALS
HEIGHT: 62 IN | SYSTOLIC BLOOD PRESSURE: 127 MMHG | BODY MASS INDEX: 29.81 KG/M2 | WEIGHT: 162 LBS | DIASTOLIC BLOOD PRESSURE: 76 MMHG | OXYGEN SATURATION: 99 % | TEMPERATURE: 98 F | HEART RATE: 70 BPM | RESPIRATION RATE: 16 BRPM

## 2024-05-11 RX ORDER — GABAPENTIN 300 MG/1
300 CAPSULE ORAL 3 TIMES DAILY PRN
Qty: 15 CAPSULE | Refills: 0 | Status: SHIPPED | OUTPATIENT
Start: 2024-05-11

## 2024-05-11 RX ORDER — IBUPROFEN 600 MG/1
600 TABLET ORAL EVERY 6 HOURS PRN
Qty: 20 TABLET | Refills: 0 | Status: SHIPPED | OUTPATIENT
Start: 2024-05-11

## 2024-05-11 RX ORDER — PSEUDOEPHEDRINE HCL 30 MG
100 TABLET ORAL DAILY
Qty: 30 CAPSULE | Refills: 0 | Status: SHIPPED | OUTPATIENT
Start: 2024-05-11 | End: 2024-06-10

## 2024-05-11 RX ORDER — HYDROCODONE BITARTRATE AND ACETAMINOPHEN 5; 325 MG/1; MG/1
1 TABLET ORAL EVERY 6 HOURS PRN
Qty: 12 TABLET | Refills: 0 | Status: SHIPPED | OUTPATIENT
Start: 2024-05-11

## 2024-05-11 NOTE — DISCHARGE SUMMARY
[facility]   part of [facility]    Discharge Summary    Ness Sanchez Patient Status:  Inpatient    1985 MRN [id number]   Location [facility] 2SW-J Attending Mario Lynne MD   Hosp Day # 2 PCP BEATRICE KEARNEY     Date of Admission: 2024 Disposition: Home or Self Care     Date of Discharge: 2024    Admitting Diagnosis: Pregnancy (HCC) [Z34.90]    Discharge Diagnosis:   Patient Active Problem List   Diagnosis    Other acne    Dyschromia, unspecified    Family history of ovarian cancer    Pregnancy (HCC)       Reason for Admission: Repeat Csection/PROM    Physical Exam:   Vitals:    24 0833   BP: 127/76   Pulse: 70   Resp: 16   Temp: 97.5 °F (36.4 °C)       General: No acute distress  Pulm: nonlabored breathing  Cardiac: regular rate  Abd: soft, nontender, fundus firm below umbilicus, incision clean, dry, intact with steri strips  Ext: nontender lower extremities       History of Present Illness:   Ness Sanchez is a 38 year old G4 P 2012  female with EDC 24 at 37 and 4/7 weeks gestation who is being admitted for  rupture of membranes .   She states she had a gush of fluid this am.  H/o previous  x 2.  Pregnancy otherwise normal.      Hospital Course:   Presented to triage with ROM in setting of 2 prior csection. Underwent repeat csection. Postpartum course uncomplicated and was found stable for discharge home    Consultations: anesthesia    Procedures: RCS/BS    Complications: none    Discharge Condition: Good         Discharge Medications:      Discharge Medications        START taking these medications        Instructions Prescription details   docusate sodium 100 MG Caps  Commonly known as: COLACE      Take 100 mg by mouth daily.   Stop taking on: Basia 10, 2024  Quantity: 30 capsule  Refills: 0     gabapentin 300 MG Caps  Commonly known as: Neurontin      Take 1 capsule (300 mg total) by mouth 3 (three) times daily as needed.   Quantity: 15  capsule  Refills: 0     HYDROcodone-acetaminophen 5-325 MG Tabs  Commonly known as: Norco      Take 1 tablet by mouth every 6 (six) hours as needed for Pain.   Quantity: 12 tablet  Refills: 0     ibuprofen 600 MG Tabs  Commonly known as: Motrin      Take 1 tablet (600 mg total) by mouth every 6 (six) hours as needed for Pain.   Quantity: 20 tablet  Refills: 0            STOP taking these medications      JAE OR        Ziana 1.2-0.025 % Gel  Generic drug: Clindamycin-Tretinoin                  Where to Get Your Medications        These medications were sent to [id number] - [place], [place] - [address] AT [address], [phone], [phone]  [address], [facility] [address]      Phone: [phone]   docusate sodium 100 MG Caps  gabapentin 300 MG Caps  HYDROcodone-acetaminophen 5-325 MG Tabs  ibuprofen 600 MG Tabs         Follow up Visits: Follow-up with Dr. Lynne in 6 week        Hope Powell MD  5/11/2024  2:11 PM

## 2024-05-11 NOTE — PLAN OF CARE
Problem: GASTROINTESTINAL - ADULT  Goal: Minimal or absence of nausea and vomiting  Description: INTERVENTIONS:  - Maintain adequate hydration with IV or PO as ordered and tolerated  - Nasogastric tube to low intermittent suction as ordered  - Evaluate effectiveness of ordered antiemetic medications  - Provide nonpharmacologic comfort measures as appropriate  - Advance diet as tolerated, if ordered  - Obtain nutritional consult as needed  - Evaluate fluid balance  Outcome: Completed  Goal: Maintains or returns to baseline bowel function  Description: INTERVENTIONS:  - Assess bowel function  - Maintain adequate hydration with IV or PO as ordered and tolerated  - Evaluate effectiveness of GI medications  - Encourage mobilization and activity  - Obtain nutritional consult as needed  - Establish a toileting routine/schedule  - Consider collaborating with pharmacy to review patient's medication profile  Outcome: Completed     Problem: SAFETY ADULT - FALL  Goal: Free from fall injury  Description: INTERVENTIONS:  - Assess pt frequently for physical needs  - Identify cognitive and physical deficits and behaviors that affect risk of falls.  - Baxter fall precautions as indicated by assessment.  - Educate pt/family on patient safety including physical limitations  - Instruct pt to call for assistance with activity based on assessment  - Modify environment to reduce risk of injury  - Provide assistive devices as appropriate  - Consider OT/PT consult to assist with strengthening/mobility  - Encourage toileting schedule  Outcome: Completed     Problem: SKIN/TISSUE INTEGRITY - ADULT  Goal: Skin integrity remains intact  Description: INTERVENTIONS  - Assess and document risk factors for pressure ulcer development  - Assess and document skin integrity  - Monitor for areas of redness and/or skin breakdown  - Initiate interventions, skin care algorithm/standards of care as needed  Outcome: Progressing  Goal: Incision(s),  wounds(s) or drain site(s) healing without S/S of infection  Description: INTERVENTIONS:  - Assess and document risk factors for pressure ulcer development  - Assess and document skin integrity  - Assess and document dressing/incision, wound bed, drain sites and surrounding tissue  - Implement wound care per orders  - Initiate isolation precautions as appropriate  - Initiate Pressure Ulcer prevention bundle as indicated  Outcome: Progressing     Problem: POSTPARTUM  Goal: Long Term Goal:Experiences normal postpartum course  Description: INTERVENTIONS:  - Assess and monitor vital signs and lab values.  - Assess fundus and lochia.  - Provide ice/sitz baths for perineum discomfort.  - Monitor healing of incision/episiotomy/laceration, and assess for signs and symptoms of infection and hematoma.  - Assess bladder function and monitor for bladder distention.  - Provide/instruct/assist with pericare as needed.  - Provide VTE prophylaxis as needed.  - Monitor bowel function.  - Encourage ambulation and provide assistance as needed.  - Assess and monitor emotional status and provide social service/psych resources as needed.  - Utilize standard precautions and use personal protective equipment as indicated. Ensure aseptic care of all intravenous lines and invasive tubes/drains.  - Obtain immunization and exposure to communicable diseases history.  Outcome: Progressing  Goal: Optimize infant feeding at the breast  Description: INTERVENTIONS:  - Initiate breast feeding within first hour after birth.   - Monitor effectiveness of current breast feeding efforts.  - Assess support systems available to mother/family.  - Identify cultural beliefs/practices regarding lactation, letdown techniques, maternal food preferences.  - Assess mother's knowledge and previous experience with breast feeding.  - Provide information as needed about early infant feeding cues (e.g., rooting, lip smacking, sucking fingers/hand) versus late cue of  crying.  - Discuss/demonstrate breast feeding aids (e.g., infant sling, nursing footstool/pillows, and breast pumps).  - Encourage mother/other family members to express feelings/concerns, and actively listen.  - Educate father/SO about benefits of breast feeding and how to manage common lactation challenges.  - Recommend avoidance of specific medications or substances incompatible with breast feeding.  - Assess and monitor for signs of nipple pain/trauma.  - Instruct and provide assistance with proper latch.  - Review techniques for milk expression (breast pumping) and storage of breast milk. Provide pumping equipment/supplies, instructions and assistance, as needed.  - Encourage rooming-in and breast feeding on demand.  - Encourage skin-to-skin contact.  - Provide LC support as needed.  - Assess for and manage engorgement.  - Provide breast feeding education handouts and information on community breast feeding support.   Outcome: Progressing  Goal: Establishment of adequate milk supply with medication/procedure interruptions  Description: INTERVENTIONS:  - Review techniques for milk expression (breast pumping).   - Provide pumping equipment/supplies, instructions, and assistance until it is safe to breastfeed infant.  Outcome: Progressing  Goal: Appropriate maternal -  bonding  Description: INTERVENTIONS:  - Assess caregiver- interactions.  - Assess caregiver's emotional status and coping mechanisms.  - Encourage caregiver to participate in  daily care.  - Assess support systems available to mother/family.  - Provide /case management support as needed.  Outcome: Progressing

## 2024-05-11 NOTE — PLAN OF CARE
Problem: SKIN/TISSUE INTEGRITY - ADULT  Goal: Skin integrity remains intact  Description: INTERVENTIONS  - Assess and document risk factors for pressure ulcer development  - Assess and document skin integrity  - Monitor for areas of redness and/or skin breakdown  - Initiate interventions, skin care algorithm/standards of care as needed  Outcome: Completed  Goal: Incision(s), wounds(s) or drain site(s) healing without S/S of infection  Description: INTERVENTIONS:  - Assess and document risk factors for pressure ulcer development  - Assess and document skin integrity  - Assess and document dressing/incision, wound bed, drain sites and surrounding tissue  - Implement wound care per orders  - Initiate isolation precautions as appropriate  - Initiate Pressure Ulcer prevention bundle as indicated  Outcome: Completed     Problem: POSTPARTUM  Goal: Long Term Goal:Experiences normal postpartum course  Description: INTERVENTIONS:  - Assess and monitor vital signs and lab values.  - Assess fundus and lochia.  - Provide ice/sitz baths for perineum discomfort.  - Monitor healing of incision/episiotomy/laceration, and assess for signs and symptoms of infection and hematoma.  - Assess bladder function and monitor for bladder distention.  - Provide/instruct/assist with pericare as needed.  - Provide VTE prophylaxis as needed.  - Monitor bowel function.  - Encourage ambulation and provide assistance as needed.  - Assess and monitor emotional status and provide social service/psych resources as needed.  - Utilize standard precautions and use personal protective equipment as indicated. Ensure aseptic care of all intravenous lines and invasive tubes/drains.  - Obtain immunization and exposure to communicable diseases history.  Outcome: Completed  Goal: Optimize infant feeding at the breast  Description: INTERVENTIONS:  - Initiate breast feeding within first hour after birth.   - Monitor effectiveness of current breast feeding  efforts.  - Assess support systems available to mother/family.  - Identify cultural beliefs/practices regarding lactation, letdown techniques, maternal food preferences.  - Assess mother's knowledge and previous experience with breast feeding.  - Provide information as needed about early infant feeding cues (e.g., rooting, lip smacking, sucking fingers/hand) versus late cue of crying.  - Discuss/demonstrate breast feeding aids (e.g., infant sling, nursing footstool/pillows, and breast pumps).  - Encourage mother/other family members to express feelings/concerns, and actively listen.  - Educate father/SO about benefits of breast feeding and how to manage common lactation challenges.  - Recommend avoidance of specific medications or substances incompatible with breast feeding.  - Assess and monitor for signs of nipple pain/trauma.  - Instruct and provide assistance with proper latch.  - Review techniques for milk expression (breast pumping) and storage of breast milk. Provide pumping equipment/supplies, instructions and assistance, as needed.  - Encourage rooming-in and breast feeding on demand.  - Encourage skin-to-skin contact.  - Provide LC support as needed.  - Assess for and manage engorgement.  - Provide breast feeding education handouts and information on community breast feeding support.   Outcome: Completed  Goal: Establishment of adequate milk supply with medication/procedure interruptions  Description: INTERVENTIONS:  - Review techniques for milk expression (breast pumping).   - Provide pumping equipment/supplies, instructions, and assistance until it is safe to breastfeed infant.  Outcome: Completed  Goal: Appropriate maternal -  bonding  Description: INTERVENTIONS:  - Assess caregiver- interactions.  - Assess caregiver's emotional status and coping mechanisms.  - Encourage caregiver to participate in  daily care.  - Assess support systems available to mother/family.  - Provide social  services/case management support as needed.  Outcome: Completed

## 2024-05-12 PROBLEM — Z98.891 H/O CESAREAN SECTION: Status: ACTIVE | Noted: 2024-05-12

## 2024-05-13 NOTE — PAYOR COMM NOTE
--------------  ADMISSION REVIEW     Payor: [facility]/HMO/POS/EPO  Subscriber #:  [id number]  Authorization Number: [id number]    Admit date: 24  Admit time: 12:00 PM       REVIEW DOCUMENTATION:  ED Provider Notes    No notes of this type exist for this encounter.        Date/Time Temp Pulse Resp BP SpO2 Weight O2 Device O2 Flow Rate (L/min) [facility]    24 0833 97.5 °F (36.4 °C) 70 16 127/76 -- -- -- --     05/10/24 2020 98.1 °F (36.7 °C) 83 16 110/74 -- -- -- -- [place]    05/10/24 1408 97.7 °F (36.5 °C) 72 18 110/66 -- -- -- --     05/10/24 1215 98.2 °F (36.8 °C) 79 18 112/75 99 % -- -- --     05/10/24 1011 -- 69 18 109/80 99 % -- -- --     05/10/24 0806 97.6 °F (36.4 °C) 70 18 104/69 98 % -- -- --     05/10/24 0545 97.7 °F (36.5 °C) 70 18 108/71 -- -- -- --     05/10/24 0537 -- -- -- -- 97 % -- -- --     05/10/24 0318 -- 68 16 107/73 98 % -- None (Room air) --     05/10/24 0127 97.6 °F (36.4 °C) 69 18 100/67 94 % -- None (Room air) --         H&P  Hosp Day # 0 PCP BEATRICE KEARNEY      SUBJECTIVE:     Ness Sanchez is a 38 year old G4 P   female with EDC 24 at 37 and 4/7 weeks gestation who is being admitted for  rupture of membranes .   She states she had a gush of fluid this am.  H/o previous  x 2.  Pregnancy otherwise normal.       Obstetric History:                    OB History    Para Term  AB Living   4 2 2 0 1 2   SAB IAB Ectopic Multiple Live Births      1 0 0 0 2          # Outcome Date GA Lbr Rober/2nd Weight Sex Type Anes PTL Lv   4 Current                     3 Term 21 39w2d   8 lb 9.2 oz (3.89 kg) M CS-LTranv Spinal N KULDIP   2 Term 04/10/19 40w5d   6 lb 10.9 oz (3.03 kg) M CS-LTranv EPI N KULDIP      Complications: Other Excessive Bleeding   1 SAB                        Past Medical History:   Past Medical History       Past Medical History:    Family history of ovarian cancer         Past Social History:   Past Surgical History          Past Surgical History:   Procedure Laterality Date                 Family History:   Family History   No family history on file.     Social History:   Social History            Tobacco Use    Smoking status: Never    Smokeless tobacco: Never   Substance Use Topics    Alcohol use: No       Alcohol/week: 0.0 standard drinks of alcohol         Home Meds:   Prescriptions Prior to Admission           Medications Prior to Admission   Medication Sig Dispense Refill Last Dose    Drospirenone-Ethinyl Estradiol (J[facility] OR) Take by mouth. (Patient not taking: Reported on 10/30/2023)          ZIANA 1.2-0.025 % Apply Externally Gel Apply to face QD (Patient not taking: Reported on 10/30/2023) 60 g 3           Allergies:   Allergies   No Known Allergies        OBJECTIVE:     Temp:  [98.9 °F (37.2 °C)] 98.9 °F (37.2 °C)  Pulse:  [80] 80  Resp:  [18] 18  BP: (123)/(74) 123/74           Lungs:   clear to auscultation bilaterally    Heart:   regular rate and rhythm, regular rate and rhythm, S1, S2 normal, no murmur, click, rub or gallop    Abdomen: soft, nontender, nondistended, no abnormal masses, no epigastric pain    Fetal Surveillance:  130 BPM   Fetal heart variability: moderate  Fetal Heart Rate accelerations: yes  Uterine contractions: none      Cervix: not digitally examined      Lab Review:  B, Rh+, Rubella-immune, Hepatitis B surface antigen non-reactive, GBS negative        ASSESSMENT/PLAN:     37 and 4/7 weeks gestation.  2. SROM/previous  x2 - will proceed with delivery.  Risks of  discussed including but not limited to bleeding, infection, damage to internal organs and thromboembolic events.  Risks to future pregnancies also reviewed.  3. FWB - reassuring  4. Desires bilateral salpingectomy for sterilization.  Risks discussed.     Risks, benefits, alternatives and possible complications have been discussed in detail with the patient.  All questions answered and all appropriate consents have  been signed       OP NOTE       Preoperative Diagnosis: IUP at 37 4/7 weeks; PROM; previous  x2; desiring sterilization                                              Postoperative Diagnosis: Same     Procedure: Repeat  Low Transverse  Section and bilateral salpingectomy     Primary surgeon: Maged     Assistant: ALLEN Kaufman     Indications:  PROM /previous  x 2     Surgical Findings: Baby: viable female     Weight 5lbs 12oz  APGAR 1': 9  APGAR 5': 9        Anesthesia :Spinal     EBL: see QBL    5/10 OB Progress Note POD#1     S: She is feeling well. Minimal VB. Pain controlled. Bottle feeding. Nelson recently removed, no void yet. + flatus, no BM     O: Blood pressure 110/66, pulse 72, temperature 97.7 °F (36.5 °C), temperature source Oral, resp. rate 18, height 5' 2\" (1.575 m), weight 162 lb (73.5 kg), last menstrual period 2023, SpO2 99%, not currently breastfeeding.     Intake/Output Summary (Last 24 hours) at 5/10/2024 1506  Last data filed at 5/10/2024 1253      Gross per 24 hour   Intake 2513.75 ml   Output 2680 ml   Net -166.25 ml         Gen: NAD, AAOx3  Breasts: soft, nontender, nonerythematous  Heart: RRR, no m/r/g  Lungs: CTA B/L  Abdomen: soft, minimally tender, nondistended, incision C/D/I with pressure dressing  Gyne: minimal lochia  Ext: trace pitting edema           Lab Results   Component Value Date     WBC 9.4 05/10/2024     HGB 8.9 05/10/2024     HCT 32.0 05/10/2024     .0 05/10/2024         A/P: POD #1 s/p LTCS  1. Continue pain control with tylenol and motrin. Oxy PRN                -- Toradol for first 24 hours then ibuprofen scheduled  2. Breastfeeding, given encouragement                -- Lactation consult PRN  3. Postpartum anemia                -- Hgb 8.9, asymptomatic . Venofer then remove IV  4. DVT ppx: continue ambulation  5. Fluids/elec: saline lock today then remove     Continue inpatient observation     --------------  DISCHARGE REVIEW     Payor: [employer]/HMO/POS/EPO  Subscriber #:  [id number]  Authorization Number: [id number]    Admit date: 24  Admit time:  12:00 PM  Discharge Date: 2024  2:27 PM     Admitting Physician: Mario Lynne MD  Attending Physician:  No att. providers found  Primary Care Physician: Beatrice Kearney          Discharge Summary Notes        Discharge Summary signed by Hope Powell MD at 2024  2:13 PM       Author: Hope Powell MD Specialty: OBSTETRICS & GYNECOLOGY Author Type: Physician    Filed: 2024  2:13 PM Date of Service: 2024  2:11 PM Status: Signed    : Hope Powell MD (Physician)           [facility]   part of [facility]    Discharge Summary    Ness Sanchez Patient Status:  Inpatient    1985 MRN [id number]   Location [facility] 2SW-J Attending Mario Lynne MD   Hosp Day # 2 PCP BEATRICE KEARNEY     Date of Admission: 2024 Disposition: Home or Self Care     Date of Discharge: 2024    Admitting Diagnosis: Pregnancy (HCC) [Z34.90]    Discharge Diagnosis:   Patient Active Problem List   Diagnosis    Other acne    Dyschromia, unspecified    Family history of ovarian cancer    Pregnancy (HCC)       Reason for Admission: Repeat Csection/PROM    Physical Exam:   Vitals:    24 0833   BP: 127/76   Pulse: 70   Resp: 16   Temp: 97.5 °F (36.4 °C)       General: No acute distress  Pulm: nonlabored breathing  Cardiac: regular rate  Abd: soft, nontender, fundus firm below umbilicus, incision clean, dry, intact with steri strips  Ext: nontender lower extremities       History of Present Illness:   Ness Sanchez is a 38 year old G4 P 2012  female with EDC 24 at 37 and 4/7 weeks gestation who is being admitted for  rupture of membranes .   She states she had a gush of fluid this am.  H/o previous  x 2.  Pregnancy otherwise normal.      Hospital Course:   Presented to triage with ROM in setting of 2  prior csection. Underwent repeat csection. Postpartum course uncomplicated and was found stable for discharge home    Consultations: anesthesia    Procedures: RCS/BS    Complications: none    Discharge Condition: Good         Discharge Medications:      Discharge Medications        START taking these medications        Instructions Prescription details   docusate sodium 100 MG Caps  Commonly known as: COLACE      Take 100 mg by mouth daily.   Stop taking on: Basia 10, 2024  Quantity: 30 capsule  Refills: 0     gabapentin 300 MG Caps  Commonly known as: Neurontin      Take 1 capsule (300 mg total) by mouth 3 (three) times daily as needed.   Quantity: 15 capsule  Refills: 0     HYDROcodone-acetaminophen 5-325 MG Tabs  Commonly known as: Norco      Take 1 tablet by mouth every 6 (six) hours as needed for Pain.   Quantity: 12 tablet  Refills: 0     ibuprofen 600 MG Tabs  Commonly known as: Motrin      Take 1 tablet (600 mg total) by mouth every 6 (six) hours as needed for Pain.   Quantity: 20 tablet  Refills: 0            STOP taking these medications      [facility] OR        Ziana 1.2-0.025 % Gel  Generic drug: Clindamycin-Tretinoin                  Where to Get Your Medications        These medications were sent to [id number] - [place] [address] AT [address], [phone], [phone]  [facility] [address], [facility] [address]      Phone: [phone]   docusate sodium 100 MG Caps  gabapentin 300 MG Caps  HYDROcodone-acetaminophen 5-325 MG Tabs  ibuprofen 600 MG Tabs         Follow up Visits: Follow-up with Dr. Lynne in 6 week        Hope Powell MD  5/11/2024  2:11 PM    Electronically signed by Hope Powell MD on 5/11/2024  2:13 PM         REVIEWER COMMENTS

## 2024-05-14 ENCOUNTER — TELEPHONE (OUTPATIENT)
Dept: OBGYN UNIT | Facility: HOSPITAL | Age: 39
End: 2024-05-14

## 2024-05-14 NOTE — PROGRESS NOTES
Reviewed self and infant care w / mom, she verbalizes understanding of instructions reviewed. Encourage to follow up w/ MDs as directed and w/ questions/concerns. C/o some abd pain but is taking tyl /motrin as prescribed, has had 2 BM but not passing much flatus and not drinking enough fluids, shayy for pumping. enc more po fluids more freq to pass more flatus, if not improved by am, will contact OB office.

## 2024-06-21 ENCOUNTER — HOSPITAL ENCOUNTER (EMERGENCY)
Facility: HOSPITAL | Age: 39
Discharge: HOME | End: 2024-06-21
Attending: EMERGENCY MEDICINE

## 2024-06-21 VITALS
BODY MASS INDEX: 28 KG/M2 | SYSTOLIC BLOOD PRESSURE: 121 MMHG | TEMPERATURE: 98 F | RESPIRATION RATE: 18 BRPM | WEIGHT: 151 LBS | HEART RATE: 67 BPM | OXYGEN SATURATION: 100 % | DIASTOLIC BLOOD PRESSURE: 82 MMHG

## 2024-06-21 DIAGNOSIS — N93.9 ABNORMAL VAGINAL BLEEDING: Primary | ICD-10-CM

## 2024-06-21 LAB
ALBUMIN SERPL-MCNC: 3.9 G/DL (ref 3.4–5)
ALBUMIN/GLOB SERPL: 1.1 {RATIO} (ref 1–2)
ALP LIVER SERPL-CCNC: 84 U/L
ALT SERPL-CCNC: 37 U/L
ANION GAP SERPL CALC-SCNC: 5 MMOL/L (ref 0–18)
ANTIBODY SCREEN: NEGATIVE
AST SERPL-CCNC: 18 U/L (ref 15–37)
B-HCG UR QL: NEGATIVE
BASOPHILS # BLD AUTO: 0.02 X10(3) UL (ref 0–0.2)
BASOPHILS NFR BLD AUTO: 0.5 %
BILIRUB SERPL-MCNC: 0.6 MG/DL (ref 0.1–2)
BUN BLD-MCNC: 10 MG/DL (ref 9–23)
CALCIUM BLD-MCNC: 9.3 MG/DL (ref 8.5–10.1)
CHLORIDE SERPL-SCNC: 107 MMOL/L (ref 98–112)
CO2 SERPL-SCNC: 27 MMOL/L (ref 21–32)
CREAT BLD-MCNC: 0.66 MG/DL
EGFRCR SERPLBLD CKD-EPI 2021: 114 ML/MIN/1.73M2 (ref 60–?)
EOSINOPHIL # BLD AUTO: 0.11 X10(3) UL (ref 0–0.7)
EOSINOPHIL NFR BLD AUTO: 2.8 %
ERYTHROCYTE [DISTWIDTH] IN BLOOD BY AUTOMATED COUNT: 22.5 %
GLOBULIN PLAS-MCNC: 3.4 G/DL (ref 2.8–4.4)
GLUCOSE BLD-MCNC: 65 MG/DL (ref 70–99)
HCT VFR BLD AUTO: 44.5 %
HGB BLD-MCNC: 13.3 G/DL
IMM GRANULOCYTES # BLD AUTO: 0.01 X10(3) UL (ref 0–1)
IMM GRANULOCYTES NFR BLD: 0.3 %
LYMPHOCYTES # BLD AUTO: 1.29 X10(3) UL (ref 1–4)
LYMPHOCYTES NFR BLD AUTO: 32.3 %
MCH RBC QN AUTO: 23.8 PG (ref 26–34)
MCHC RBC AUTO-ENTMCNC: 29.9 G/DL (ref 31–37)
MCV RBC AUTO: 79.5 FL
MONOCYTES # BLD AUTO: 0.33 X10(3) UL (ref 0.1–1)
MONOCYTES NFR BLD AUTO: 8.3 %
NEUTROPHILS # BLD AUTO: 2.23 X10 (3) UL (ref 1.5–7.7)
NEUTROPHILS # BLD AUTO: 2.23 X10(3) UL (ref 1.5–7.7)
NEUTROPHILS NFR BLD AUTO: 55.8 %
OSMOLALITY SERPL CALC.SUM OF ELEC: 285 MOSM/KG (ref 275–295)
PLATELET # BLD AUTO: 143 10(3)UL (ref 150–450)
PLATELET MORPHOLOGY: NORMAL
PLATELETS.RETICULATED NFR BLD AUTO: 11.2 % (ref 0–7)
POTASSIUM SERPL-SCNC: 3.9 MMOL/L (ref 3.5–5.1)
PROT SERPL-MCNC: 7.3 G/DL (ref 6.4–8.2)
RBC # BLD AUTO: 5.6 X10(6)UL
RH BLOOD TYPE: POSITIVE
SODIUM SERPL-SCNC: 139 MMOL/L (ref 136–145)
WBC # BLD AUTO: 4 X10(3) UL (ref 4–11)

## 2024-06-21 PROCEDURE — 85025 COMPLETE CBC W/AUTO DIFF WBC: CPT | Performed by: EMERGENCY MEDICINE

## 2024-06-21 PROCEDURE — 80053 COMPREHEN METABOLIC PANEL: CPT | Performed by: EMERGENCY MEDICINE

## 2024-06-21 PROCEDURE — 86900 BLOOD TYPING SEROLOGIC ABO: CPT | Performed by: EMERGENCY MEDICINE

## 2024-06-21 PROCEDURE — 96360 HYDRATION IV INFUSION INIT: CPT

## 2024-06-21 PROCEDURE — 86850 RBC ANTIBODY SCREEN: CPT | Performed by: EMERGENCY MEDICINE

## 2024-06-21 PROCEDURE — 81025 URINE PREGNANCY TEST: CPT

## 2024-06-21 PROCEDURE — 99285 EMERGENCY DEPT VISIT HI MDM: CPT

## 2024-06-21 PROCEDURE — 86901 BLOOD TYPING SEROLOGIC RH(D): CPT | Performed by: EMERGENCY MEDICINE

## 2024-06-21 PROCEDURE — 99284 EMERGENCY DEPT VISIT MOD MDM: CPT

## 2024-06-21 NOTE — ED INITIAL ASSESSMENT (HPI)
Patient endorses heavy vaginal bleeding. LMP was Saturday. Patient is postpartum 6 weeks yesterday. Denies chest pain or sob. Soaking through a pad every hour, larger blood clots. Pain around right side of abdomen doesn't radiate. No thinners. Patient had a  37 weeks.    *Spoke with Danyelle in L&D she stated to keep patient down here nothing they would do up there*

## 2024-06-21 NOTE — ED PROVIDER NOTES
Patient Seen in: [facility] Emergency Department      History     Chief Complaint   Patient presents with    Bleeding     Stated Complaint: 6 WEEKS POST PARTUM HEAVT BLEEDING    Subjective:   HPI    This is a 39-year-old female presents emergency room for evaluation of vaginal bleeding.  Patient reports she is 6 weeks postpartum , started having some vaginal bleeding on Saturday, bleeding got much worse today with clots.  Patient did see her OB in the office today Dr. Lynne, per office note it was thought possibly menses but plan to check ultrasound to rule out pathology.  Patient did have tubal ligation per note.  Patient reports she has ultrasound on Tuesday scheduled in the office with her OB, states the bleeding has slowed down but came to the ER for evaluation.  Denies feel lightheaded or dizzy.  Denies chest pain or shortness of breath.  Denies abdominal pain.  Denies nausea vomiting or diarrhea.  Denies melena medic easy.    Objective:   Past Medical History:    Family history of ovarian cancer              Past Surgical History:   Procedure Laterality Date                      Social History     Socioeconomic History    Marital status:    Tobacco Use    Smoking status: Never    Smokeless tobacco: Never   Substance and Sexual Activity    Alcohol use: No     Alcohol/week: 0.0 standard drinks of alcohol    Drug use: No    Sexual activity: Never     Social Determinants of Health     Financial Resource Strain: Low Risk  (2024)    Financial Resource Strain     Difficulty of Paying Living Expenses: Not hard at all     Med Affordability: No   Food Insecurity: No Food Insecurity (2024)    Food Insecurity     Food Insecurity: Never true   Transportation Needs: No Transportation Needs (2024)    Transportation Needs     Lack of Transportation: No   Stress: No Stress Concern Present (2024)    Stress     Feeling of Stress : No   Housing Stability: Low Risk  (2024)     Housing Stability     Housing Instability: No              Review of Systems    Positive for stated complaint: 6 WEEKS POST PARTUM HEAVT BLEEDING  Other systems are as noted in HPI.  Constitutional and vital signs reviewed.      All other systems reviewed and negative except as noted above.    Physical Exam     ED Triage Vitals   BP 06/21/24 1314 121/82   Pulse 06/21/24 1314 78   Resp 06/21/24 1314 18   Temp 06/21/24 1314 98.2 °F (36.8 °C)   Temp src 06/21/24 1314 Oral   SpO2 06/21/24 1314 98 %   O2 Device 06/21/24 1630 None (Room air)       Current Vitals:   Vital Signs  BP: 121/82  Pulse: 67  Resp: 18  Temp: 98.2 °F (36.8 °C)  Temp src: Oral  MAP (mmHg): 98    Oxygen Therapy  SpO2: 100 %  O2 Device: None (Room air)            Physical Exam    GENERAL: Patient is awake, alert, well-appearing, in no acute distress.  HEENT: no scleral icterus.  Mucous membranes are moist  HEART: Regular rate and rhythm, no murmurs.  LUNGS: Clear to auscultation bilaterally.  No Rales, no rhonchi, no wheezing, no stridor.  ABDOMEN: Soft, nondistended,non tender  Pelvic exam: Performed with nurse Anna as chaperone-no external vaginal lesions, there is a small amount of dark blood in vaginal vault.  EXTREMITIES: No peripheral edema, no calf tenderness,       ED Course     Labs Reviewed   COMP METABOLIC PANEL (14) - Abnormal; Notable for the following components:       Result Value    Glucose 65 (*)     All other components within normal limits   RBC MORPHOLOGY SCAN - Abnormal; Notable for the following components:    RBC Morphology See morphology below (*)     All other components within normal limits   CBC W/ DIFFERENTIAL - Abnormal; Notable for the following components:    RBC 5.60 (*)     .0 (*)     Immature Platelet Fraction 11.2 (*)     MCV 79.5 (*)     MCH 23.8 (*)     MCHC 29.9 (*)     All other components within normal limits   POCT PREGNANCY URINE - Normal   CBC WITH DIFFERENTIAL WITH PLATELET    Narrative:     The  following orders were created for panel order CBC With Differential With Platelet.  Procedure                               Abnormality         Status                     ---------                               -----------         ------                     CBC W/ DIFFERENTIAL[[id number]]          Abnormal            Final result                 Please view results for these tests on the individual orders.   TYPE AND SCREEN    Narrative:     The following orders were created for panel order Type and screen.  Procedure                               Abnormality         Status                     ---------                               -----------         ------                     ABORH (Blood Type)[[id number]]                               Final result               Antibody Screen[[id number]]                                  Final result                 Please view results for these tests on the individual orders.   ABORH (BLOOD TYPE)   ANTIBODY SCREEN   RAINBOW DRAW LAVENDER   RAINBOW DRAW LIGHT GREEN                      MDM        Differential diagnosis before testing includes but not limited to anemia, electrolyte abnormality, pregnancy, which is a medical condition that poses a threat to life/function    Discussion of management (consult/physicians, social work, pharmacy,ect) Dr. Hwang, OB    Diagnostic tests and medications considered but not ordered considered ultrasound however not performed after discussion with OB, they recommended patient have this performed in office on Tuesday as previously scheduled.    Course of Events during Emergency Room Visit include IV established blood work obtained.  CBC white count 4.0 hemoglobin 13.3 platelet 143.  Chemistry sodium 139 potassium 3.9 BUN 10 creatinine 0.66 glucose 65.  Negative pregnancy.  Patient was discussed with OB, did not recommend emergent ultrasound at this time, patient is scheduled to have ultrasound in office on Tuesday.  I discussed this with the  patient she agrees with plan.  Patient instructed to call her OB if any problems over the weekend or return the emergency room if any further problems.  Patient agrees with plan vital signs stable discharge good condition with     Shared decision making was utilized         Discharge  I have discussed with the patient the results of test, differential diagnosis, treatment plan, warning signs and symptoms which should prompt immediate return.  They expressed understanding of these instructions and agrees to the following plan provided.  They were given written discharge instructions and agrees to return for any concerns and voiced understanding and all questions were answered.    Note to patient: The 21st Century Cures Act makes medical notes like these available to patients in the interest of transparency. However, this is a medical document intended as peer to peer communication. It is written in medical language and may contain abbreviations or verbiage that are unfamiliar. It may appear blunt or direct. Medical documents are intended to carry relevant information, facts as evident, and the clinical opinion of the practitioner.                                            Medical Decision Making      Disposition and Plan     Clinical Impression:  1. Abnormal vaginal bleeding         Disposition:  Discharge  6/21/2024  4:40 pm    Follow-up:  Mario Lynne MD  [address] [address]  [phone]    Call in 1 day(s)            Medications Prescribed:  Discharge Medication List as of 6/21/2024  5:00 PM